# Patient Record
Sex: FEMALE | URBAN - METROPOLITAN AREA
[De-identification: names, ages, dates, MRNs, and addresses within clinical notes are randomized per-mention and may not be internally consistent; named-entity substitution may affect disease eponyms.]

---

## 2017-11-16 NOTE — PATIENT DISCUSSION
Discussed patient should be seen with any reoccurance of floaters by retina due to past history OS superior temporal/retinal laser - if happens in one eye can happen in other.

## 2018-04-11 ENCOUNTER — IMPORTED ENCOUNTER (OUTPATIENT)
Dept: URBAN - METROPOLITAN AREA CLINIC 9 | Facility: CLINIC | Age: 38
End: 2018-04-11

## 2021-10-18 ASSESSMENT — KERATOMETRY
OS_K2POWER_DIOPTERS: 45.5
OD_K1POWER_DIOPTERS: 44.5
OD_AXISANGLE2_DEGREES: 92
OS_K1POWER_DIOPTERS: 44.75
OD_K2POWER_DIOPTERS: 45
OS_AXISANGLE2_DEGREES: 84
OD_AXISANGLE_DEGREES: 2
OS_AXISANGLE_DEGREES: 174

## 2021-10-18 ASSESSMENT — TONOMETRY
OS_IOP_MMHG: 13
OD_IOP_MMHG: 13

## 2021-10-18 ASSESSMENT — VISUAL ACUITY
OS_CC: 20/20 SN
OD_CC: 20/20 SN